# Patient Record
Sex: MALE | Race: WHITE | NOT HISPANIC OR LATINO | Employment: OTHER | URBAN - METROPOLITAN AREA
[De-identification: names, ages, dates, MRNs, and addresses within clinical notes are randomized per-mention and may not be internally consistent; named-entity substitution may affect disease eponyms.]

---

## 2020-01-02 PROBLEM — M19.90 OSTEOARTHRITIS: Status: ACTIVE | Noted: 2020-01-02

## 2020-01-02 PROBLEM — I10 ESSENTIAL HYPERTENSION: Status: ACTIVE | Noted: 2020-01-02

## 2020-01-02 PROBLEM — K21.9 GASTROESOPHAGEAL REFLUX DISEASE: Status: ACTIVE | Noted: 2020-01-02

## 2020-01-14 PROBLEM — K52.9 ENTERITIS: Status: ACTIVE | Noted: 2019-02-24

## 2020-01-14 PROBLEM — Z87.19 S/P REPAIR OF VENTRAL HERNIA: Status: ACTIVE | Noted: 2020-01-14

## 2020-01-14 PROBLEM — R14.0 FLATULENCE/GAS PAIN/BELCHING: Status: ACTIVE | Noted: 2019-12-25

## 2020-01-14 PROBLEM — M19.91 PRIMARY OSTEOARTHRITIS: Status: ACTIVE | Noted: 2017-01-27

## 2020-01-14 PROBLEM — R11.2 NAUSEA AND VOMITING: Status: ACTIVE | Noted: 2019-02-25

## 2020-01-14 PROBLEM — D72.829 LEUKOCYTOSIS: Status: ACTIVE | Noted: 2019-12-25

## 2020-01-14 PROBLEM — Z98.890 S/P COLONOSCOPY: Status: ACTIVE | Noted: 2020-01-14

## 2020-01-14 PROBLEM — Z98.890 S/P REPAIR OF VENTRAL HERNIA: Status: ACTIVE | Noted: 2020-01-14

## 2020-11-04 PROBLEM — N39.0 UTI (URINARY TRACT INFECTION): Status: ACTIVE | Noted: 2020-11-04

## 2021-10-14 PROBLEM — K57.20 DIVERTICULITIS OF LARGE INTESTINE WITH PERFORATION WITHOUT ABSCESS OR BLEEDING: Status: ACTIVE | Noted: 2021-10-14

## 2021-10-14 PROBLEM — E87.6 HYPOKALEMIA: Status: ACTIVE | Noted: 2021-10-14

## 2021-10-14 PROBLEM — K57.32 DIVERTICULITIS LARGE INTESTINE: Status: ACTIVE | Noted: 2021-10-14

## 2021-10-14 PROBLEM — E87.1 HYPONATREMIA: Status: ACTIVE | Noted: 2021-10-14

## 2021-10-15 PROBLEM — K59.1 FUNCTIONAL DIARRHEA: Status: ACTIVE | Noted: 2021-10-15

## 2021-10-16 PROBLEM — E87.1 HYPONATREMIA: Status: RESOLVED | Noted: 2021-10-14 | Resolved: 2021-10-16

## 2021-10-16 PROBLEM — K59.1 FUNCTIONAL DIARRHEA: Status: RESOLVED | Noted: 2021-10-15 | Resolved: 2021-10-16

## 2021-10-16 PROBLEM — K57.20 DIVERTICULITIS OF LARGE INTESTINE WITH PERFORATION WITHOUT ABSCESS OR BLEEDING: Status: RESOLVED | Noted: 2021-10-14 | Resolved: 2021-10-16

## 2021-10-16 PROBLEM — E87.6 HYPOKALEMIA: Status: RESOLVED | Noted: 2021-10-14 | Resolved: 2021-10-16

## 2021-10-16 PROBLEM — D72.829 LEUKOCYTOSIS: Status: RESOLVED | Noted: 2019-12-25 | Resolved: 2021-10-16

## 2021-12-16 PROBLEM — M19.90 ARTHRITIS: Status: ACTIVE | Noted: 2021-12-16

## 2021-12-16 PROBLEM — M62.838 MUSCLE SPASM: Status: ACTIVE | Noted: 2021-12-16

## 2021-12-16 PROBLEM — R35.0 URINARY FREQUENCY: Status: ACTIVE | Noted: 2021-12-16

## 2021-12-21 ENCOUNTER — PATIENT OUTREACH (OUTPATIENT)
Dept: ADMINISTRATIVE | Facility: HOSPITAL | Age: 81
End: 2021-12-21

## 2021-12-22 PROBLEM — R11.2 NAUSEA AND VOMITING: Status: RESOLVED | Noted: 2019-02-25 | Resolved: 2021-12-22

## 2021-12-22 PROBLEM — D72.829 LEUKOCYTOSIS: Status: RESOLVED | Noted: 2019-12-25 | Resolved: 2021-12-22

## 2022-01-04 PROBLEM — K56.609 SMALL BOWEL OBSTRUCTION: Status: ACTIVE | Noted: 2021-07-04

## 2022-01-15 PROBLEM — N20.0 NEPHROLITHIASIS: Status: ACTIVE | Noted: 2022-01-15

## 2022-05-09 PROBLEM — D72.829 LEUCOCYTOSIS: Status: ACTIVE | Noted: 2022-05-09

## 2022-05-10 PROBLEM — I10 ESSENTIAL HYPERTENSION: Status: RESOLVED | Noted: 2020-01-02 | Resolved: 2022-05-10

## 2022-05-10 PROBLEM — K56.609 SMALL BOWEL OBSTRUCTION: Status: RESOLVED | Noted: 2021-07-04 | Resolved: 2022-05-10

## 2022-05-10 PROBLEM — D72.829 LEUCOCYTOSIS: Status: RESOLVED | Noted: 2022-05-09 | Resolved: 2022-05-10

## 2022-08-09 ENCOUNTER — PATIENT OUTREACH (OUTPATIENT)
Dept: ADMINISTRATIVE | Facility: HOSPITAL | Age: 82
End: 2022-08-09

## 2022-08-09 NOTE — PROGRESS NOTES
Health Maintenance Due   Topic Date Due    COVID-19 Vaccine (1) Never done    TETANUS VACCINE  Never done    Shingles Vaccine (1 of 2) Never done   Chart review  Attempted to contact regarding BP but had to [facility] for a return call. Pt doesn't have a portal.

## 2023-02-28 PROBLEM — I10 ESSENTIAL HYPERTENSION: Status: RESOLVED | Noted: 2020-01-02 | Resolved: 2023-02-28
